# Patient Record
(demographics unavailable — no encounter records)

---

## 2025-01-29 NOTE — HISTORY OF PRESENT ILLNESS
[de-identified] : 3 year old male s/p Adenoidectomy and BMT 03/16/23. current cold History of left ear otorrhea in October/November, 2024 and treated with PO antibiotics  History of snoring, mouth breathing and occasional gasping and pauses History of chronic nasal congestion and drooling. Not using nasal saline or nasal steroid sprays  He is a mouth breather  No parental concerns with speech development or hearing

## 2025-01-29 NOTE — PHYSICAL EXAM
[Effusion] : effusion [3+] : 3+ [Clear to Auscultation] : lungs were clear to auscultation bilaterally [Normal Gait and Station] : normal gait and station [Normal muscle strength, symmetry and tone of facial, head and neck musculature] : normal muscle strength, symmetry and tone of facial, head and neck musculature [Normal] : no cervical lymphadenopathy [Exposed Vessel] : left anterior vessel not exposed [Wheezing] : no wheezing [Increased Work of Breathing] : no increased work of breathing with use of accessory muscles and retractions [de-identified] : josh

## 2025-01-29 NOTE — ASSESSMENT
[FreeTextEntry1] : This child presents with a history of adenotonsillar hypertrophy and sleep disordered breathing.  I have also discussed with the family:      1.  A sleep study/overnight polysomnogram evaluation, along with a continued trial of intranasal steroids. I discussed the risks of nasal steroids (ex: Fluticasone, off label non FDA approved use) and proper application of steroids laterally in the nostrils (saline sprays may also be added in epistaxis is an issue) and the importance of consistency (but that prolonged use may cause growth issues).       2.  Given the patient's corresponding history and physical examination findings, I think that it is reasonable to proceed with a tonsillectomy and adenoidectomy.I have explained the risks of tonsillectomy and adenoidectomy including but not limited to general anesthesia, bleeding, infection, failure to extubate, injury to the teeth/lips/gums/local structures, tonsil and/or adenoid regrowth, persistence of symptoms, velopharyngeal insufficiency, nasopharyngeal stenosis, swallowing dysfunction, post-operative hemorrhage, voice changes, and possible need for further procedures. I have discussed with the family and offered two options to proceed. This child has increased weight which I believe is contributing to the illness. I spent an extensive amount of time counseling the family on weight loss and the importance of a moderate diet/exercise. Family will attempt to make changes or consider f/u with a nutritionist/dietician if PCP recommends.  This increased weight puts the patient at increased risk for perioperative complications, risk of postoperative failure and the need for CPAP, several days prolonged hospitalization, and possible need for prolonged mechanical ventilatory support.  The family opts for fu after PSG.   Repeat audio at fu and eval r ear fluid resolution

## 2025-01-29 NOTE — DATA REVIEWED
[FreeTextEntry1] : An audiogram was performed today to evaluate eustachian tube status and hearing status and the results were reviewed and reveal: Tymps: AD type B tympanogram, AS type A tympanogram Soundfield/Thresholds: WNL

## 2025-01-29 NOTE — CONSULT LETTER
[Dear  ___] : Dear  [unfilled], [Consult Letter:] : I had the pleasure of evaluating your patient, [unfilled]. [Please see my note below.] : Please see my note below. [Consult Closing:] : Thank you very much for allowing me to participate in the care of this patient.  If you have any questions, please do not hesitate to contact me. [Sincerely,] : Sincerely, [FreeTextEntry2] : Vargas Brink MD  [FreeTextEntry3] : Aminah Graham MD   Pediatric Otolaryngology/ Head & Neck Surgery [facility] , Otolaryngology; [facility]  [facility] [place] [address] Tel [phone] Fax [phone]

## 2025-01-29 NOTE — SOCIAL HISTORY
[TextEntry] : The child lives at home with parents and older brother  No pets No secondhand smoke exposure